# Patient Record
Sex: MALE | Race: BLACK OR AFRICAN AMERICAN | Employment: OTHER
[De-identification: names, ages, dates, MRNs, and addresses within clinical notes are randomized per-mention and may not be internally consistent; named-entity substitution may affect disease eponyms.]

---

## 2020-10-18 ENCOUNTER — NURSE TRIAGE (OUTPATIENT)
Dept: OTHER | Facility: CLINIC | Age: 85
End: 2020-10-18

## 2020-10-19 NOTE — TELEPHONE ENCOUNTER
Received pt's call from the [address] [place] [address] nurse line. Reason for Disposition   [1] SEVERE pain (e.g., excruciating, unable to use hand at all) AND [2] not improved after 2 hours of pain medicine    Answer Assessment - Initial Assessment Questions  1. ONSET: \"When did the pain start? \"      A week ago, comes and goes    2. LOCATION: \"Where is the pain located? \"      Right hand     3. PAIN: \"How bad is the pain? \" (Scale 1-10; or mild, moderate, severe)    - MILD (1-3): doesn't interfere with normal activities    - MODERATE (4-7): interferes with normal activities (e.g., work or school) or awakens from sleep    - SEVERE (8-10): excruciating pain, unable to use hand at all      8 out of 10    4. WORK OR EXERCISE: \"Has there been any recent work or exercise that involved this part of the body? \"      Denies     5. CAUSE: \"What do you think is causing the pain? \"      Pt is not sure    6. AGGRAVATING FACTORS: \"What makes the pain worse? \" (e.g., using computer)      Nothing seems to cause the pain, comes out of no where     7. OTHER SYMPTOMS: \"Do you have any other symptoms? \" (e.g., neck pain, swelling, rash, numbness, fever)      Pt states that the veins in his hand look larger than normal    8. PREGNANCY: \"Is there any chance you are pregnant? \" \"When was your last menstrual period? \"      N/a    Protocols used: HAND AND WRIST PAIN-ADULT-    Pt is calling with c/o severe hand pain. Denies injury. Pt states that he just took pain medication. Recommended that pt be seen tonight if pain level does not decrease in the next 2 hours. Care advice provided. Attention Provider: Thank you for allowing me to participate in the care of your patient. Please do not respond through this encounter as the response is not directed to a shared pool.